# Patient Record
Sex: FEMALE | URBAN - METROPOLITAN AREA
[De-identification: names, ages, dates, MRNs, and addresses within clinical notes are randomized per-mention and may not be internally consistent; named-entity substitution may affect disease eponyms.]

---

## 2020-10-06 ENCOUNTER — LAB OUTSIDE AN ENCOUNTER (OUTPATIENT)
Dept: URBAN - METROPOLITAN AREA CLINIC 37 | Facility: CLINIC | Age: 80
End: 2020-10-06

## 2020-10-06 ENCOUNTER — OFFICE VISIT (OUTPATIENT)
Dept: URBAN - METROPOLITAN AREA CLINIC 37 | Facility: CLINIC | Age: 80
End: 2020-10-06

## 2020-10-06 VITALS — HEIGHT: 61 IN | BODY MASS INDEX: 25.49 KG/M2 | WEIGHT: 135 LBS

## 2020-10-06 PROBLEM — 62315008: Status: ACTIVE | Noted: 2020-10-06

## 2020-10-06 PROBLEM — 305058001: Status: ACTIVE | Noted: 2020-10-06

## 2020-10-06 PROBLEM — 266435005: Status: ACTIVE | Noted: 2020-10-06

## 2020-10-06 RX ORDER — KETOCONAZOLE 20 MG/ML
5 ML SHAMPOO, SUSPENSION TOPICAL
Status: ON HOLD | COMMUNITY

## 2020-10-06 RX ORDER — CELECOXIB 200 MG/1
1 CAPSULE WITH FOOD CAPSULE ORAL ONCE A DAY
Qty: 30 | Status: ACTIVE | COMMUNITY

## 2020-10-06 RX ORDER — CARVEDILOL 3.12 MG/1
1 TABLET WITH FOOD TABLET, FILM COATED ORAL TWICE A DAY
Status: ACTIVE | COMMUNITY

## 2020-10-06 RX ORDER — HYDROXYZINE HYDROCHLORIDE 25 MG/1
1 TABLET AS NEEDED TABLET, FILM COATED ORAL
Qty: 90 | Status: ON HOLD | COMMUNITY

## 2020-10-06 RX ORDER — TRIAMCINOLONE ACETONIDE 5 MG/G
1 APPLICATION CREAM TOPICAL
Status: ON HOLD | COMMUNITY

## 2020-10-06 RX ORDER — LORATADINE 10 MG
1 PACKET MIXED WITH 8 OUNCES OF FLUID TABLET,DISINTEGRATING ORAL ONCE A DAY
Qty: 30 | Status: ON HOLD | COMMUNITY

## 2020-10-06 RX ORDER — INSULIN DEGLUDEC INJECTION 100 U/ML
AS DIRECTED INJECTION, SOLUTION SUBCUTANEOUS
Status: ACTIVE | COMMUNITY

## 2020-10-06 RX ORDER — ICOSAPENT ETHYL 1000 MG/1
2 CAPSULES WITH MEALS CAPSULE ORAL TWICE A DAY
Qty: 120 | Status: ON HOLD | COMMUNITY

## 2020-10-06 RX ORDER — ATORVASTATIN CALCIUM 10 MG/1
1 TABLET TABLET, FILM COATED ORAL ONCE A DAY
Qty: 30 | Status: ON HOLD | COMMUNITY

## 2020-10-06 RX ORDER — ALBUTEROL SULFATE 90 UG/1
1 PUFF AS NEEDED AEROSOL, METERED RESPIRATORY (INHALATION)
Status: ACTIVE | COMMUNITY

## 2020-10-06 RX ORDER — INSULIN LISPRO 100 [IU]/ML
AS DIRECTED INJECTION, SUSPENSION SUBCUTANEOUS
Status: ACTIVE | COMMUNITY

## 2020-10-06 RX ORDER — PREGABALIN 75 MG/1
1 CAPSULE CAPSULE ORAL ONCE A DAY
Status: ACTIVE | COMMUNITY

## 2020-10-06 RX ORDER — OMEPRAZOLE 40 MG/1
1 CAPSULE CAPSULE, DELAYED RELEASE ORAL ONCE A DAY
Status: ACTIVE | COMMUNITY

## 2020-10-06 RX ORDER — PENTOXIFYLLINE 400 MG/1
1 TABLET WITH MEALS TABLET, EXTENDED RELEASE ORAL ONCE A DAY
Status: ACTIVE | COMMUNITY
Start: 2020-07-02

## 2020-10-06 RX ORDER — ALENDRONATE SODIUM 10 MG/1
1 TABLET 30 MINUTES BEFORE THE FIRST FOOD, BEVERAGE OR MEDICINE OF THE DAY WITH PLAIN WATER TABLET ORAL ONCE A DAY
Qty: 30 | Status: ACTIVE | COMMUNITY

## 2020-10-06 RX ORDER — LISINOPRIL 10 MG/1
1 TABLET TABLET ORAL ONCE A DAY
Qty: 30 | Status: ACTIVE | COMMUNITY

## 2020-10-06 RX ORDER — LOSARTAN POTASSIUM 50 MG/1
1 TABLET TABLET ORAL ONCE A DAY
Qty: 30 | Status: ON HOLD | COMMUNITY

## 2020-10-06 RX ORDER — ATORVASTATIN CALCIUM 20 MG/1
1 TABLET TABLET, FILM COATED ORAL ONCE A DAY
Qty: 30 | Status: ACTIVE | COMMUNITY

## 2020-10-06 NOTE — HPI-MIGRATED HPI
Interim investigations : Labs done on: ->  * 06/16/2020, ordered by PCP:  - CMP: Glu: 104 (H); BUN: 20; Cr: 1.07 (H); Na: 142; K: 5.5 (H); Alb: 4.1; ALT: 10; AST: 16; ALP: 65; Tbili: 0.3 - CBC: WBC: 7.6; RBC: 4.26; Hgb: 12.4; Hct: 39.1; Plt: 322;   Initial consultation : Patient is here for -> GERD and loose stool  Onset of symptoms: about 2 weeks ago  Characteristics: pain around umbilicus. Pain on 6-7 /10 scale and relieved by defecation Current BM patterns: 6-7 loose or watery stools daily . Sometimes she saw blood on wiping ( but it can be from multiple wiping times  Aggravating factors: unknown Associated symptoms: lower abdominal pain, heartburn  Relieving factors: somewhat improving with Loperamide Medications tried: antidiarrheal meds ( Loperamide TID ) from PCP with relief after that but in the next day the cycle begins again She also takes  Prilosec 40 mg daily  Tests/evaluations done previously: RUQ US abdomen She had CEGD by Dr. Jackson many years  She could not recall any foods which trigger the episode ;

## 2020-10-07 ENCOUNTER — LAB OUTSIDE AN ENCOUNTER (OUTPATIENT)
Dept: URBAN - METROPOLITAN AREA CLINIC 37 | Facility: CLINIC | Age: 80
End: 2020-10-07

## 2020-10-09 ENCOUNTER — TELEPHONE ENCOUNTER (OUTPATIENT)
Dept: URBAN - METROPOLITAN AREA CLINIC 35 | Facility: CLINIC | Age: 80
End: 2020-10-09

## 2020-10-09 LAB — CLOSTRIDIUM DIFFICILE: (no result)

## 2020-10-09 RX ORDER — ATORVASTATIN CALCIUM 10 MG/1
1 TABLET TABLET, FILM COATED ORAL ONCE A DAY
Qty: 30 | Status: ON HOLD | COMMUNITY

## 2020-10-09 RX ORDER — INSULIN LISPRO 100 [IU]/ML
AS DIRECTED INJECTION, SUSPENSION SUBCUTANEOUS
Status: ACTIVE | COMMUNITY

## 2020-10-09 RX ORDER — LOSARTAN POTASSIUM 50 MG/1
1 TABLET TABLET ORAL ONCE A DAY
Qty: 30 | Status: ON HOLD | COMMUNITY

## 2020-10-09 RX ORDER — HYDROXYZINE HYDROCHLORIDE 25 MG/1
1 TABLET AS NEEDED TABLET, FILM COATED ORAL
Qty: 90 | Status: ON HOLD | COMMUNITY

## 2020-10-09 RX ORDER — OMEPRAZOLE 40 MG/1
1 CAPSULE CAPSULE, DELAYED RELEASE ORAL ONCE A DAY
Status: ACTIVE | COMMUNITY

## 2020-10-09 RX ORDER — TRIAMCINOLONE ACETONIDE 5 MG/G
1 APPLICATION CREAM TOPICAL
Status: ON HOLD | COMMUNITY

## 2020-10-09 RX ORDER — ALBUTEROL SULFATE 90 UG/1
1 PUFF AS NEEDED AEROSOL, METERED RESPIRATORY (INHALATION)
Status: ACTIVE | COMMUNITY

## 2020-10-09 RX ORDER — ALENDRONATE SODIUM 10 MG/1
1 TABLET 30 MINUTES BEFORE THE FIRST FOOD, BEVERAGE OR MEDICINE OF THE DAY WITH PLAIN WATER TABLET ORAL ONCE A DAY
Qty: 30 | Status: ACTIVE | COMMUNITY

## 2020-10-09 RX ORDER — CHOLESTYRAMINE 4 G/9G
1 SCOOP POWDER, FOR SUSPENSION ORAL ONCE A DAY
Qty: 30 | Refills: 1 | OUTPATIENT
Start: 2020-10-23

## 2020-10-09 RX ORDER — INSULIN DEGLUDEC INJECTION 100 U/ML
AS DIRECTED INJECTION, SOLUTION SUBCUTANEOUS
Status: ACTIVE | COMMUNITY

## 2020-10-09 RX ORDER — LORATADINE 10 MG
1 PACKET MIXED WITH 8 OUNCES OF FLUID TABLET,DISINTEGRATING ORAL ONCE A DAY
Qty: 30 | Status: ON HOLD | COMMUNITY

## 2020-10-09 RX ORDER — CELECOXIB 200 MG/1
1 CAPSULE WITH FOOD CAPSULE ORAL ONCE A DAY
Qty: 30 | Status: ACTIVE | COMMUNITY

## 2020-10-09 RX ORDER — KETOCONAZOLE 20 MG/ML
5 ML SHAMPOO, SUSPENSION TOPICAL
Status: ON HOLD | COMMUNITY

## 2020-10-09 RX ORDER — PENTOXIFYLLINE 400 MG/1
1 TABLET WITH MEALS TABLET, EXTENDED RELEASE ORAL ONCE A DAY
Status: ACTIVE | COMMUNITY
Start: 2020-07-02

## 2020-10-09 RX ORDER — ICOSAPENT ETHYL 1000 MG/1
2 CAPSULES WITH MEALS CAPSULE ORAL TWICE A DAY
Qty: 120 | Status: ON HOLD | COMMUNITY

## 2020-10-09 RX ORDER — PREGABALIN 75 MG/1
1 CAPSULE CAPSULE ORAL ONCE A DAY
Status: ACTIVE | COMMUNITY

## 2020-10-09 RX ORDER — ATORVASTATIN CALCIUM 20 MG/1
1 TABLET TABLET, FILM COATED ORAL ONCE A DAY
Qty: 30 | Status: ACTIVE | COMMUNITY

## 2020-10-09 RX ORDER — LISINOPRIL 10 MG/1
1 TABLET TABLET ORAL ONCE A DAY
Qty: 30 | Status: ACTIVE | COMMUNITY

## 2020-10-09 RX ORDER — CARVEDILOL 3.12 MG/1
1 TABLET WITH FOOD TABLET, FILM COATED ORAL TWICE A DAY
Status: ACTIVE | COMMUNITY

## 2020-10-13 LAB
LEUKOCYTES: (no result)
TRICHROME (1): (no result)

## 2020-10-15 ENCOUNTER — LAB OUTSIDE AN ENCOUNTER (OUTPATIENT)
Dept: URBAN - METROPOLITAN AREA CLINIC 37 | Facility: CLINIC | Age: 80
End: 2020-10-15

## 2020-10-16 LAB
ABSOLUTE BASOPHILS: 63
ABSOLUTE EOSINOPHILS: 231
ABSOLUTE LYMPHOCYTES: 3738
ABSOLUTE MONOCYTES: 735
ABSOLUTE NEUTROPHILS: 2233
BASOPHILS: 0.9
EOSINOPHILS: 3.3
HEMATOCRIT: 39.2
HEMOGLOBIN: 12.9
LYMPHOCYTES: 53.4
MCH: 30.4
MCHC: 32.9
MCV: 92.2
MONOCYTES: 10.5
MPV: 10.2
NEUTROPHILS: 31.9
PLATELET COUNT: 232
RDW: 14.2
RED BLOOD CELL COUNT: 4.25
WHITE BLOOD CELL COUNT: 7

## 2020-10-29 ENCOUNTER — OFFICE VISIT (OUTPATIENT)
Dept: URBAN - METROPOLITAN AREA CLINIC 37 | Facility: CLINIC | Age: 80
End: 2020-10-29

## 2020-10-29 ENCOUNTER — LAB OUTSIDE AN ENCOUNTER (OUTPATIENT)
Dept: URBAN - METROPOLITAN AREA CLINIC 37 | Facility: CLINIC | Age: 80
End: 2020-10-29

## 2020-10-29 VITALS — BODY MASS INDEX: 25.49 KG/M2 | HEIGHT: 61 IN | WEIGHT: 135 LBS

## 2020-10-29 RX ORDER — ALENDRONATE SODIUM 10 MG/1
1 TABLET 30 MINUTES BEFORE THE FIRST FOOD, BEVERAGE OR MEDICINE OF THE DAY WITH PLAIN WATER TABLET ORAL ONCE A DAY
Qty: 30 | Status: ACTIVE | COMMUNITY

## 2020-10-29 RX ORDER — INSULIN DEGLUDEC INJECTION 100 U/ML
AS DIRECTED INJECTION, SOLUTION SUBCUTANEOUS
Status: ACTIVE | COMMUNITY

## 2020-10-29 RX ORDER — ATORVASTATIN CALCIUM 20 MG/1
1 TABLET TABLET, FILM COATED ORAL ONCE A DAY
Qty: 30 | Status: ACTIVE | COMMUNITY

## 2020-10-29 RX ORDER — PREGABALIN 75 MG/1
1 CAPSULE CAPSULE ORAL ONCE A DAY
Status: ACTIVE | COMMUNITY

## 2020-10-29 RX ORDER — OMEPRAZOLE 40 MG/1
1 CAPSULE CAPSULE, DELAYED RELEASE ORAL ONCE A DAY
Status: ACTIVE | COMMUNITY

## 2020-10-29 RX ORDER — INSULIN LISPRO 100 [IU]/ML
AS DIRECTED INJECTION, SUSPENSION SUBCUTANEOUS
Status: ACTIVE | COMMUNITY

## 2020-10-29 RX ORDER — CHOLESTYRAMINE 4 G/9G
1 SCOOP POWDER, FOR SUSPENSION ORAL ONCE A DAY
Qty: 30 | Refills: 1 | Status: ACTIVE | COMMUNITY
Start: 2020-10-23

## 2020-10-29 RX ORDER — LISINOPRIL 10 MG/1
1 TABLET TABLET ORAL ONCE A DAY
Qty: 30 | Status: ACTIVE | COMMUNITY

## 2020-10-29 RX ORDER — CELECOXIB 200 MG/1
1 CAPSULE WITH FOOD CAPSULE ORAL ONCE A DAY
Qty: 30 | Status: ACTIVE | COMMUNITY

## 2020-10-29 RX ORDER — CARVEDILOL 3.12 MG/1
1 TABLET WITH FOOD TABLET, FILM COATED ORAL TWICE A DAY
Status: ACTIVE | COMMUNITY

## 2020-10-29 RX ORDER — SODIUM SULFATE, POTASSIUM SULFATE, MAGNESIUM SULFATE 17.5; 3.13; 1.6 G/ML; G/ML; G/ML
AS DIRECTED SOLUTION, CONCENTRATE ORAL ONCE
Qty: 1 KIT | Refills: 0 | OUTPATIENT
Start: 2020-10-29

## 2020-10-29 RX ORDER — PENTOXIFYLLINE 400 MG/1
1 TABLET WITH MEALS TABLET, EXTENDED RELEASE ORAL ONCE A DAY
Status: ACTIVE | COMMUNITY
Start: 2020-07-02

## 2020-10-29 RX ORDER — ALBUTEROL SULFATE 90 UG/1
1 PUFF AS NEEDED AEROSOL, METERED RESPIRATORY (INHALATION)
Status: ACTIVE | COMMUNITY

## 2020-10-29 NOTE — HPI-MIGRATED HPI
Interim investigations : Labs done on: ->  * 10/07/2020 & 10/15/2020, see below;   Follow up OV : Patient is here for a routine OV for -> GERD and Diarrhea;   Follow up OV : Last OV was -> 3 weeks ago * GERD:  Last EGD done several years ago with Dr. Chris Jackson  Patient continues taking Omeprazole 40 mg daily, as prescribed by PCP Current symptoms:   * Diarrhea:  Patient had stool test done 10/07/2020 to r/o C-diff: stool test came back negative. Therefore, on 10/09/2020 patient was prescribed a trial of Cholestryramine 4 gm, 1 scoop once a day Currnet BM: 5-6 small BMs daily ;

## 2020-10-31 ENCOUNTER — LAB OUTSIDE AN ENCOUNTER (OUTPATIENT)
Dept: URBAN - METROPOLITAN AREA CLINIC 37 | Facility: CLINIC | Age: 80
End: 2020-10-31

## 2020-11-25 ENCOUNTER — OFFICE VISIT (OUTPATIENT)
Dept: URBAN - METROPOLITAN AREA SURGERY CENTER 8 | Facility: SURGERY CENTER | Age: 80
End: 2020-11-25

## 2020-12-18 ENCOUNTER — OFFICE VISIT (OUTPATIENT)
Dept: URBAN - METROPOLITAN AREA CLINIC 37 | Facility: CLINIC | Age: 80
End: 2020-12-18

## 2020-12-18 VITALS — HEIGHT: 61 IN | WEIGHT: 135 LBS | BODY MASS INDEX: 25.49 KG/M2

## 2020-12-18 PROBLEM — 398032003: Status: ACTIVE | Noted: 2020-12-18

## 2020-12-18 PROBLEM — 68496003: Status: ACTIVE | Noted: 2020-12-18

## 2020-12-18 PROBLEM — 721704005: Status: ACTIVE | Noted: 2020-12-18

## 2020-12-18 PROBLEM — 39772007: Status: ACTIVE | Noted: 2020-12-18

## 2020-12-18 PROBLEM — 724538004: Status: ACTIVE | Noted: 2020-12-15

## 2020-12-18 RX ORDER — INSULIN LISPRO 100 [IU]/ML
AS DIRECTED INJECTION, SUSPENSION SUBCUTANEOUS
Status: ACTIVE | COMMUNITY

## 2020-12-18 RX ORDER — LISINOPRIL 10 MG/1
1 TABLET TABLET ORAL ONCE A DAY
Qty: 30 | Status: ACTIVE | COMMUNITY

## 2020-12-18 RX ORDER — ALENDRONATE SODIUM 10 MG/1
1 TABLET 30 MINUTES BEFORE THE FIRST FOOD, BEVERAGE OR MEDICINE OF THE DAY WITH PLAIN WATER TABLET ORAL ONCE A DAY
Qty: 30 | Status: ACTIVE | COMMUNITY

## 2020-12-18 RX ORDER — CHOLESTYRAMINE 4 G/9G
1 SCOOP POWDER, FOR SUSPENSION ORAL ONCE A DAY
Qty: 30 | Refills: 1 | Status: ACTIVE | COMMUNITY
Start: 2020-10-23

## 2020-12-18 RX ORDER — PREGABALIN 75 MG/1
1 CAPSULE CAPSULE ORAL ONCE A DAY
Status: ACTIVE | COMMUNITY

## 2020-12-18 RX ORDER — CELECOXIB 200 MG/1
1 CAPSULE WITH FOOD CAPSULE ORAL ONCE A DAY
Qty: 30 | Status: ACTIVE | COMMUNITY

## 2020-12-18 RX ORDER — OMEPRAZOLE 40 MG/1
1 CAPSULE CAPSULE, DELAYED RELEASE ORAL ONCE A DAY
Status: ACTIVE | COMMUNITY

## 2020-12-18 RX ORDER — CARVEDILOL 3.12 MG/1
1 TABLET WITH FOOD TABLET, FILM COATED ORAL TWICE A DAY
Status: ACTIVE | COMMUNITY

## 2020-12-18 RX ORDER — ALBUTEROL SULFATE 90 UG/1
1 PUFF AS NEEDED AEROSOL, METERED RESPIRATORY (INHALATION)
Status: ACTIVE | COMMUNITY

## 2020-12-18 RX ORDER — INSULIN DEGLUDEC INJECTION 100 U/ML
AS DIRECTED INJECTION, SOLUTION SUBCUTANEOUS
Status: ACTIVE | COMMUNITY

## 2020-12-18 RX ORDER — PENTOXIFYLLINE 400 MG/1
1 TABLET WITH MEALS TABLET, EXTENDED RELEASE ORAL ONCE A DAY
Status: ACTIVE | COMMUNITY
Start: 2020-07-02

## 2020-12-18 RX ORDER — CHOLESTYRAMINE 4 G/9G
1 PACKET MIXED WITH WATER OR NON-CARBONATED DRINK POWDER, FOR SUSPENSION ORAL ONCE A DAY
Qty: 30 | Refills: 5 | OUTPATIENT
Start: 2020-12-18

## 2020-12-18 RX ORDER — ATORVASTATIN CALCIUM 20 MG/1
1 TABLET TABLET, FILM COATED ORAL ONCE A DAY
Qty: 30 | Status: ACTIVE | COMMUNITY

## 2020-12-18 NOTE — HPI-MIGRATED HPI
Post-op OV : After ERCP on -> ;   Post-op OV : Flexible sigmoidoscopy on -> ;   Post-op OV : After Small Bowel Enteroscopy -> ;   Post-op OV : Patient reports of current symptoms -> Current BM: 4-6 BMs daily;   Post-op OV : After Colonoscopy on -> 11/25/2020, at which time five small polyps were detected and resected. Histology showed they were tubular adenoma and benign colonic mucosa with lymphoid aggregate. Diverticulosis were found in the ascending colon and sigmoid colon. Non-bleeding grade II internal and external hemorrhoids were found during retroflexion but not banded. Good bowel prep;   Post-op OV : Patient denies -> rectal bleeding, fever, nausea & vomiting since the procedure date;   Post-op OV : Patient -> denies any new changes in his/her health status since last OV;   Post-op OV : After EGD on -> the same date, GE junction was irrergular. Abnormal mucosa was found in the esophagus with bxx showing moderate chronic inflammation which may be seen in reflux esophagitis with positive small focus of intestinal metaplasia; negative for dysplasia. Gastritis were detected in the gastric antrum and body with bxx showing mild chronic gastritis with positive for intestinal metaplasia, negative for H.pylori and atrophy. Normal duodenum. EGD done for reevaluation of GERD and diarrhea Patient has been on Omeprazole 40mg from PCP;

## 2021-05-21 ENCOUNTER — OFFICE VISIT (OUTPATIENT)
Dept: URBAN - METROPOLITAN AREA CLINIC 37 | Facility: CLINIC | Age: 81
End: 2021-05-21

## 2021-05-21 VITALS
DIASTOLIC BLOOD PRESSURE: 78 MMHG | SYSTOLIC BLOOD PRESSURE: 116 MMHG | BODY MASS INDEX: 26.24 KG/M2 | WEIGHT: 139 LBS | OXYGEN SATURATION: 96 % | TEMPERATURE: 97.6 F | HEIGHT: 61 IN | HEART RATE: 53 BPM

## 2021-05-21 RX ORDER — OMEPRAZOLE 40 MG/1
1 CAPSULE CAPSULE, DELAYED RELEASE ORAL ONCE A DAY
Status: ACTIVE | COMMUNITY

## 2021-05-21 RX ORDER — DICYCLOMINE HYDROCHLORIDE 20 MG/1
1 TABLET TABLET ORAL THREE TIMES A DAY
Qty: 90 | Refills: 1 | OUTPATIENT
Start: 2021-05-21

## 2021-05-21 RX ORDER — PREGABALIN 75 MG/1
1 CAPSULE CAPSULE ORAL ONCE A DAY
Status: DISCONTINUED | COMMUNITY

## 2021-05-21 RX ORDER — INSULIN LISPRO 100 [IU]/ML
AS DIRECTED INJECTION, SUSPENSION SUBCUTANEOUS
Status: ACTIVE | COMMUNITY

## 2021-05-21 RX ORDER — IBANDRONATE SODIUM 150 MG/1
1 TABLET TABLET, FILM COATED ORAL
Status: ACTIVE | COMMUNITY

## 2021-05-21 RX ORDER — LISINOPRIL 10 MG/1
1 TABLET TABLET ORAL ONCE A DAY
Qty: 30 | Status: ACTIVE | COMMUNITY

## 2021-05-21 RX ORDER — CELECOXIB 200 MG/1
1 CAPSULE WITH FOOD CAPSULE ORAL ONCE A DAY
Qty: 30 | Status: ACTIVE | COMMUNITY

## 2021-05-21 RX ORDER — CHOLESTYRAMINE 4 G/9G
1 PACKET MIXED WITH WATER OR NON-CARBONATED DRINK POWDER, FOR SUSPENSION ORAL ONCE A DAY
Qty: 30 | Refills: 5 | Status: DISCONTINUED | COMMUNITY
Start: 2020-12-18

## 2021-05-21 RX ORDER — FLUTICASONE PROPIONATE AND SALMETEROL 100; 50 UG/1; UG/1
1 PUFF POWDER RESPIRATORY (INHALATION)
Status: ACTIVE | COMMUNITY

## 2021-05-21 RX ORDER — PENTOXIFYLLINE 400 MG/1
1 TABLET WITH MEALS TABLET, EXTENDED RELEASE ORAL ONCE A DAY
Status: ACTIVE | COMMUNITY
Start: 2020-07-02

## 2021-05-21 RX ORDER — DOCUSATE SODIUM 100 MG/1
2 CAPSULES CAPSULE ORAL ONCE A DAY
Qty: 60 CAPSULE | Refills: 5 | OUTPATIENT
Start: 2021-05-21

## 2021-05-21 RX ORDER — INSULIN DEGLUDEC INJECTION 100 U/ML
AS DIRECTED INJECTION, SOLUTION SUBCUTANEOUS
Status: ACTIVE | COMMUNITY

## 2021-05-21 RX ORDER — ALENDRONATE SODIUM 10 MG/1
1 TABLET 30 MINUTES BEFORE THE FIRST FOOD, BEVERAGE OR MEDICINE OF THE DAY WITH PLAIN WATER TABLET ORAL ONCE A DAY
Qty: 30 | Status: DISCONTINUED | COMMUNITY

## 2021-05-21 RX ORDER — CHOLESTYRAMINE 4 G/9G
1 SCOOP POWDER, FOR SUSPENSION ORAL ONCE A DAY
Qty: 30 | Refills: 1 | Status: DISCONTINUED | COMMUNITY
Start: 2020-10-23

## 2021-05-21 RX ORDER — WHEAT DEXTRIN 3 G/3.5 G
2 TABLESPOONS, MIXED IN 10OZ OF FLUID POWDER (GRAM) ORAL TWICE DAILY
Qty: 1 BOTTLE | Refills: 2 | OUTPATIENT
Start: 2021-05-21

## 2021-05-21 RX ORDER — ATORVASTATIN CALCIUM 20 MG/1
1 TABLET TABLET, FILM COATED ORAL ONCE A DAY
Qty: 30 | Status: ACTIVE | COMMUNITY

## 2021-05-21 RX ORDER — CARVEDILOL 3.12 MG/1
1 TABLET WITH FOOD TABLET, FILM COATED ORAL TWICE A DAY
Status: ACTIVE | COMMUNITY

## 2021-05-21 RX ORDER — ALBUTEROL SULFATE 90 UG/1
1 PUFF AS NEEDED AEROSOL, METERED RESPIRATORY (INHALATION)
Status: ACTIVE | COMMUNITY

## 2021-05-21 NOTE — HPI-MIGRATED HPI
Acute visit : Patient is here for an acute visit -> Constipation associated with abdominal pain  Onset of Sx:chronic  with abdominal pain She had normal BM in the morning  with normal stool but with mild straining. Throughout the day, she might feel the urge for defecation associates with periumbilical pain but could not induce the BM. In the afternoon, she will have another episode with pain but relieved with defecation Asking about her daily diet, she admits occasionally she consumed  a bag  of Pistachios while watching TV  Medications tried:  none Previous test/evaluation done: Colonoscopy on 11/25/2020, at which time five small polyps were detected and resected. Histology showed they were tubular adenoma and benign colonic mucosa with lymphoid aggregate. Diverticulosis were found in the ascending colon and sigmoid colon. 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 Abnormal mucosa was found in the esophagus with bxx showing moderate chronic inflammation which may be seen in reflux esophagitis with positive small focus of intestinal metaplasia; negative for dysplasia. Gastritis were detected in the gastric antrum and body with bxx showing mild chronic gastritis with positive for intestinal metaplasia, negative for H.pylori and atrophy. Normal duodenum. Current symptoms: denies  symptoms;

## 2021-07-23 ENCOUNTER — LAB OUTSIDE AN ENCOUNTER (OUTPATIENT)
Dept: URBAN - METROPOLITAN AREA CLINIC 37 | Facility: CLINIC | Age: 81
End: 2021-07-23

## 2021-07-23 ENCOUNTER — OFFICE VISIT (OUTPATIENT)
Dept: URBAN - METROPOLITAN AREA CLINIC 37 | Facility: CLINIC | Age: 81
End: 2021-07-23

## 2021-07-23 VITALS
DIASTOLIC BLOOD PRESSURE: 78 MMHG | HEART RATE: 75 BPM | WEIGHT: 136 LBS | SYSTOLIC BLOOD PRESSURE: 114 MMHG | HEIGHT: 61 IN | TEMPERATURE: 97.2 F | BODY MASS INDEX: 25.68 KG/M2

## 2021-07-23 PROBLEM — 14760008: Status: ACTIVE | Noted: 2021-05-21

## 2021-07-23 PROBLEM — 129851009: Status: ACTIVE | Noted: 2021-07-23

## 2021-07-23 PROBLEM — 302914006: Status: ACTIVE | Noted: 2021-05-21

## 2021-07-23 PROBLEM — 72519002: Status: ACTIVE | Noted: 2021-05-21

## 2021-07-23 PROBLEM — 443503005: Status: ACTIVE | Noted: 2021-05-21

## 2021-07-23 PROBLEM — 271864008: Status: ACTIVE | Noted: 2021-07-23

## 2021-07-23 PROBLEM — 8493009: Status: ACTIVE | Noted: 2021-05-21

## 2021-07-23 RX ORDER — SODIUM PICOSULFATE, MAGNESIUM OXIDE, AND ANHYDROUS CITRIC ACID 10; 3.5; 12 MG/160ML; G/160ML; G/160ML
160 ML LIQUID ORAL
Qty: 1 KIT | Refills: 0
Start: 2021-07-23

## 2021-07-23 RX ORDER — CARVEDILOL 3.12 MG/1
1 TABLET WITH FOOD TABLET, FILM COATED ORAL TWICE A DAY
Status: ACTIVE | COMMUNITY

## 2021-07-23 RX ORDER — INSULIN LISPRO 100 [IU]/ML
AS DIRECTED INJECTION, SUSPENSION SUBCUTANEOUS
Status: ACTIVE | COMMUNITY

## 2021-07-23 RX ORDER — OMEPRAZOLE 40 MG/1
1 CAPSULE CAPSULE, DELAYED RELEASE ORAL ONCE A DAY
Status: ACTIVE | COMMUNITY

## 2021-07-23 RX ORDER — WHEAT DEXTRIN 3 G/3.5 G
2 TABLESPOONS, MIXED IN 10OZ OF FLUID POWDER (GRAM) ORAL TWICE DAILY
Qty: 1 BOTTLE | Refills: 2 | Status: ACTIVE | COMMUNITY
Start: 2021-05-21

## 2021-07-23 RX ORDER — FLUTICASONE PROPIONATE AND SALMETEROL 100; 50 UG/1; UG/1
1 PUFF POWDER RESPIRATORY (INHALATION)
Status: ACTIVE | COMMUNITY

## 2021-07-23 RX ORDER — CELECOXIB 200 MG/1
1 CAPSULE WITH FOOD CAPSULE ORAL ONCE A DAY
Qty: 30 | Status: ACTIVE | COMMUNITY

## 2021-07-23 RX ORDER — WHEAT DEXTRIN 3 G/3.5 G
2 TABLESPOONS, MIXED IN 10OZ OF FLUID POWDER (GRAM) ORAL TWICE DAILY
Qty: 1 BOTTLE | Refills: 2 | OUTPATIENT

## 2021-07-23 RX ORDER — DOCUSATE SODIUM 100 MG/1
2 CAPSULES CAPSULE ORAL ONCE A DAY
Qty: 60 CAPSULE | Refills: 5 | OUTPATIENT

## 2021-07-23 RX ORDER — INSULIN DEGLUDEC INJECTION 100 U/ML
AS DIRECTED INJECTION, SOLUTION SUBCUTANEOUS
Status: ACTIVE | COMMUNITY

## 2021-07-23 RX ORDER — ATORVASTATIN CALCIUM 20 MG/1
1 TABLET TABLET, FILM COATED ORAL ONCE A DAY
Qty: 30 | Status: ACTIVE | COMMUNITY

## 2021-07-23 RX ORDER — IBANDRONATE SODIUM 150 MG/1
1 TABLET TABLET, FILM COATED ORAL
Status: ACTIVE | COMMUNITY

## 2021-07-23 RX ORDER — DOCUSATE SODIUM 100 MG/1
2 CAPSULES CAPSULE ORAL ONCE A DAY
Qty: 60 CAPSULE | Refills: 5 | Status: ON HOLD | COMMUNITY
Start: 2021-05-21

## 2021-07-23 RX ORDER — ALBUTEROL SULFATE 90 UG/1
1 PUFF AS NEEDED AEROSOL, METERED RESPIRATORY (INHALATION)
Status: ACTIVE | COMMUNITY

## 2021-07-23 RX ORDER — DICYCLOMINE HYDROCHLORIDE 20 MG/1
1 TABLET TABLET ORAL THREE TIMES A DAY
Qty: 90 | Refills: 1 | Status: ACTIVE | COMMUNITY
Start: 2021-05-21

## 2021-07-23 RX ORDER — PENTOXIFYLLINE 400 MG/1
1 TABLET WITH MEALS TABLET, EXTENDED RELEASE ORAL ONCE A DAY
Status: ACTIVE | COMMUNITY
Start: 2020-07-02

## 2021-07-23 RX ORDER — LISINOPRIL 10 MG/1
1 TABLET TABLET ORAL ONCE A DAY
Qty: 30 | Status: ACTIVE | COMMUNITY

## 2021-07-23 RX ORDER — DICYCLOMINE HYDROCHLORIDE 20 MG/1
1 TABLET TABLET ORAL THREE TIMES A DAY
Qty: 90 | Refills: 1 | OUTPATIENT

## 2021-07-23 NOTE — HPI-MIGRATED HPI
Follow up OV : Patient is on -> ;   Follow up OV : Patient is here for a routine OV for -> Periumbilical abdominal pain, Constipation and Owusu's esophagus;   Follow up OV : Last OV was -> 2 months ago Periumblical abdominal pain/Constipation: Last Colon done 11/25/2020, at which time five small polyps were detected and resected. Histology showed they were tubular adenoma and benign colonic mucosa with lymphoid aggregate. Diverticulosis were found in the ascending colon and sigmoid colon Patient continues taking Dicyclomine 20 mg TID Also taking Benefiber 2 tbps BID + Colace 200 mg daily Current BM: 4-5  small BMs daily but always with whitish mucus. SOmetimes she has up to 7-8 small stools /day   Owusu's Esophagus: Last EGD done Surveillance EGD in 11/2020, GE junction was irrergular. Abnormal mucosa was found in the esophagus with bxx showing moderate chronic inflammation which may be seen in reflux esophagitis with positive small focus of intestinal metaplasia; negative for dysplasia. Gastritis were detected in the gastric antrum and body with bxx showing mild chronic gastritis with positive for intestinal metaplasia, negative for H.pylori and atrophy. Normal duodenum Patient continues taking Omeprazole 40 mg daily, prescribed by PCP Current symptoms: improving;

## 2021-07-26 ENCOUNTER — OFFICE VISIT (OUTPATIENT)
Dept: URBAN - METROPOLITAN AREA MEDICAL CENTER 10 | Facility: MEDICAL CENTER | Age: 81
End: 2021-07-26

## 2021-07-27 ENCOUNTER — TELEPHONE ENCOUNTER (OUTPATIENT)
Dept: URBAN - METROPOLITAN AREA CLINIC 35 | Facility: CLINIC | Age: 81
End: 2021-07-27

## 2021-07-27 RX ORDER — BUDESONIDE 28 MG/1
1 APPLICATION AEROSOL, FOAM RECTAL TWICE A DAY
Qty: 4 CANISTER | Refills: 2 | OUTPATIENT
Start: 2021-07-27

## 2021-08-09 ENCOUNTER — OFFICE VISIT (OUTPATIENT)
Dept: URBAN - METROPOLITAN AREA CLINIC 37 | Facility: CLINIC | Age: 81
End: 2021-08-09

## 2021-08-09 VITALS
HEIGHT: 61 IN | BODY MASS INDEX: 25.86 KG/M2 | DIASTOLIC BLOOD PRESSURE: 72 MMHG | SYSTOLIC BLOOD PRESSURE: 140 MMHG | WEIGHT: 137 LBS

## 2021-08-09 PROBLEM — 300310003: Status: ACTIVE | Noted: 2021-08-09

## 2021-08-09 RX ORDER — CARVEDILOL 3.12 MG/1
1 TABLET WITH FOOD TABLET, FILM COATED ORAL TWICE A DAY
Status: ACTIVE | COMMUNITY

## 2021-08-09 RX ORDER — CELECOXIB 200 MG/1
1 CAPSULE WITH FOOD CAPSULE ORAL ONCE A DAY
Qty: 30 | Status: ACTIVE | COMMUNITY

## 2021-08-09 RX ORDER — ALBUTEROL SULFATE 90 UG/1
1 PUFF AS NEEDED AEROSOL, METERED RESPIRATORY (INHALATION)
Status: ACTIVE | COMMUNITY

## 2021-08-09 RX ORDER — CHOLESTYRAMINE 4 G/9G
1 PACKET MIXED WITH WATER OR NON-CARBONATED DRINK POWDER, FOR SUSPENSION ORAL ONCE A DAY
Qty: 30 | Refills: 5 | OUTPATIENT
Start: 2021-08-09

## 2021-08-09 RX ORDER — IBANDRONATE SODIUM 150 MG/1
1 TABLET TABLET, FILM COATED ORAL
Status: ACTIVE | COMMUNITY

## 2021-08-09 RX ORDER — FLUTICASONE PROPIONATE AND SALMETEROL 100; 50 UG/1; UG/1
1 PUFF POWDER RESPIRATORY (INHALATION)
Status: ACTIVE | COMMUNITY

## 2021-08-09 RX ORDER — LISINOPRIL 10 MG/1
1 TABLET TABLET ORAL ONCE A DAY
Qty: 30 | Status: ACTIVE | COMMUNITY

## 2021-08-09 RX ORDER — INSULIN DEGLUDEC INJECTION 100 U/ML
AS DIRECTED INJECTION, SOLUTION SUBCUTANEOUS
Status: ACTIVE | COMMUNITY

## 2021-08-09 RX ORDER — OMEPRAZOLE 40 MG/1
1 CAPSULE CAPSULE, DELAYED RELEASE ORAL ONCE A DAY
Status: ACTIVE | COMMUNITY

## 2021-08-09 RX ORDER — ATORVASTATIN CALCIUM 20 MG/1
1 TABLET TABLET, FILM COATED ORAL ONCE A DAY
Qty: 30 | Status: ACTIVE | COMMUNITY

## 2021-08-09 RX ORDER — DICYCLOMINE HYDROCHLORIDE 20 MG/1
1 TABLET TABLET ORAL THREE TIMES A DAY
Qty: 90 | Refills: 1 | Status: DISCONTINUED | COMMUNITY

## 2021-08-09 RX ORDER — BUDESONIDE 28 MG/1
1 APPLICATION AEROSOL, FOAM RECTAL TWICE A DAY
Qty: 4 CANISTER | Refills: 2 | Status: ACTIVE | COMMUNITY
Start: 2021-07-27

## 2021-08-09 RX ORDER — PENTOXIFYLLINE 400 MG/1
1 TABLET WITH MEALS TABLET, EXTENDED RELEASE ORAL ONCE A DAY
Status: ACTIVE | COMMUNITY
Start: 2020-07-02

## 2021-08-09 RX ORDER — INSULIN LISPRO 100 [IU]/ML
AS DIRECTED INJECTION, SUSPENSION SUBCUTANEOUS
Status: ACTIVE | COMMUNITY

## 2021-08-09 NOTE — HPI-MIGRATED HPI
Post-op OV : After Colonoscopy on -> 07/26/2021 due to change in BM habits and mucus in stool, at which time no polyps were found. Erythematous mucosa were noted in the proximal rectum with colonic bxx showing rectal mucosa with surface hyperplastic changes and colonic mucosa without siginificant histopathological abnormality. No evidence of collagenous colitis or lymphocytic colitis. Diverticulosis were noted in the sigmoid colon, descending colon and ascending colon. Non- bleeding grade I internal hemorrhoids were found during retroflexion but not banded. Good bowel prep;   Post-op OV : Patient denies -> rectal bleeding, fever, nausea & vomiting since the procedure date;   Post-op OV : Patient -> denies any new changes in his/her health status since last OV. Patient was prescribed with Budesonide foam x 4 weeks Current BM: 3-4 loose BMs daily, denies blood in stool;   Post-op OV : After EGD on -> ;   Follow up OV : Last OV was -> 3 weeks ago Periumblical abdominal pain/Constipation: Last Colon done 11/25/2020, at which time five small polyps (TA and colonic mucosa w/ lymphoid aggregate) were detected and resected. Diverticulosis were found in the ascending colon and sigmoid colon Patient continues taking Dicyclomine 20 mg TID Patient was also previously on Colace and Benefiber but was told to discontinue since last visit.  Current BM: 3-4BM/day and still having pain even though she is compliant with Dicyclomine 20mg everyday  Owusu's Esophagus: EGD in 11/2020, GE junction was irrergular. Abnormal mucosa was found in the esophagus with bxx showing moderate chronic inflammation which may be seen in reflux esophagitis with positive small focus of intestinal metaplasia; negative for dysplasia. Gastritis were detected in the gastric antrum and body with bxx showing mild chronic gastritis with positive for intestinal metaplasia, negative for H.pylori and atrophy. Normal duodenum Patient continues taking Omeprazole 40 mg daily, prescribed by PCP Current symptoms: improved when she takes the med but  still having intermittent acid reflux;   Follow up OV : Patient is here for a routine OV for -> constipation/periumbilical abdominal pain, Owusu's esophagus/gastritis ;

## 2021-09-08 PROBLEM — 428283002: Status: ACTIVE | Noted: 2021-08-09

## 2021-09-17 ENCOUNTER — OFFICE VISIT (OUTPATIENT)
Dept: URBAN - METROPOLITAN AREA CLINIC 37 | Facility: CLINIC | Age: 81
End: 2021-09-17

## 2021-09-17 VITALS
OXYGEN SATURATION: 100 % | DIASTOLIC BLOOD PRESSURE: 68 MMHG | BODY MASS INDEX: 25.68 KG/M2 | HEIGHT: 61 IN | HEART RATE: 62 BPM | WEIGHT: 136 LBS | SYSTOLIC BLOOD PRESSURE: 110 MMHG

## 2021-09-17 RX ORDER — INSULIN DEGLUDEC INJECTION 100 U/ML
AS DIRECTED INJECTION, SOLUTION SUBCUTANEOUS
Status: ACTIVE | COMMUNITY

## 2021-09-17 RX ORDER — ATORVASTATIN CALCIUM 20 MG/1
1 TABLET TABLET, FILM COATED ORAL ONCE A DAY
Qty: 30 | Status: ACTIVE | COMMUNITY

## 2021-09-17 RX ORDER — CELECOXIB 200 MG/1
1 CAPSULE WITH FOOD CAPSULE ORAL ONCE A DAY
Qty: 30 | Status: ACTIVE | COMMUNITY

## 2021-09-17 RX ORDER — ALBUTEROL SULFATE 90 UG/1
1 PUFF AS NEEDED AEROSOL, METERED RESPIRATORY (INHALATION)
Status: ACTIVE | COMMUNITY

## 2021-09-17 RX ORDER — INSULIN LISPRO 100 [IU]/ML
AS DIRECTED INJECTION, SUSPENSION SUBCUTANEOUS
Status: ACTIVE | COMMUNITY

## 2021-09-17 RX ORDER — CHOLESTYRAMINE 4 G/9G
1 PACKET MIXED WITH WATER OR NON-CARBONATED DRINK POWDER, FOR SUSPENSION ORAL ONCE A DAY
Qty: 30 | Refills: 5 | Status: ACTIVE | COMMUNITY
Start: 2021-08-09

## 2021-09-17 RX ORDER — PENTOXIFYLLINE 400 MG/1
1 TABLET WITH MEALS TABLET, EXTENDED RELEASE ORAL ONCE A DAY
Status: ACTIVE | COMMUNITY
Start: 2020-07-02

## 2021-09-17 RX ORDER — BUDESONIDE 28 MG/1
1 APPLICATION AEROSOL, FOAM RECTAL TWICE A DAY
Qty: 4 CANISTER | Refills: 2 | Status: ACTIVE | COMMUNITY
Start: 2021-07-27

## 2021-09-17 RX ORDER — OMEPRAZOLE 40 MG/1
1 CAPSULE CAPSULE, DELAYED RELEASE ORAL ONCE A DAY
Status: ACTIVE | COMMUNITY

## 2021-09-17 RX ORDER — CHOLESTYRAMINE 4 G/9G
1 PACKET MIXED WITH WATER OR NON-CARBONATED DRINK POWDER, FOR SUSPENSION ORAL ONCE A DAY
Qty: 30 | Refills: 5 | OUTPATIENT

## 2021-09-17 RX ORDER — IBANDRONATE SODIUM 150 MG/1
1 TABLET TABLET, FILM COATED ORAL
Status: ACTIVE | COMMUNITY

## 2021-09-17 RX ORDER — CARVEDILOL 3.12 MG/1
1 TABLET WITH FOOD TABLET, FILM COATED ORAL TWICE A DAY
Status: ACTIVE | COMMUNITY

## 2021-09-17 RX ORDER — FLUTICASONE PROPIONATE AND SALMETEROL 100; 50 UG/1; UG/1
1 PUFF POWDER RESPIRATORY (INHALATION)
Status: ACTIVE | COMMUNITY

## 2021-09-17 RX ORDER — LISINOPRIL 10 MG/1
1 TABLET TABLET ORAL ONCE A DAY
Qty: 30 | Status: ACTIVE | COMMUNITY

## 2021-09-17 NOTE — HPI-MIGRATED HPI
Follow up OV : Patient is here for a routine OV for -> constipation/periumbilical abdominal pain, Owusu's esophagus/gastritis ;   Follow up OV : Last OV was -> 4 weeks ago Erythema of colon/Loose stool: Last Colon done 07/26/2021 found no polyps, erythematous mucosa were noted in the proximal rectum diverticulosis were noted in the sigmoid colon, descending colon and ascending colon, negative for active colitis Patient was prescribed with Uceris foam and Cholestyramine 4gm QD Patient was also on Dicyclomine 20 mg TID  Current BM: 1-2 soft BM everyday. Denies any abdominal pain or rectal bleeding  Owusu's Esophagus: EGD in 11/2020 showed reflux esophagitis with positive small focus of intestinal metaplasis, mild chronic gastritis with positive for intestinal metaplasia, negative for H.pylori and atrophy. Normal duodenum Patient continues taking Omeprazole 40 mg daily, prescribed by PCP Current symptoms: improved. Denies epigastric pain, nausea/vomiting, acid reflux, or any other GI sx;

## 2022-03-17 ENCOUNTER — OFFICE VISIT (OUTPATIENT)
Dept: URBAN - METROPOLITAN AREA CLINIC 37 | Facility: CLINIC | Age: 82
End: 2022-03-17

## 2022-03-22 ENCOUNTER — OFFICE VISIT (OUTPATIENT)
Dept: URBAN - METROPOLITAN AREA CLINIC 80 | Facility: CLINIC | Age: 82
End: 2022-03-22